# Patient Record
Sex: MALE | Race: WHITE | URBAN - METROPOLITAN AREA
[De-identification: names, ages, dates, MRNs, and addresses within clinical notes are randomized per-mention and may not be internally consistent; named-entity substitution may affect disease eponyms.]

---

## 2020-03-18 ENCOUNTER — HISTORICAL (OUTPATIENT)
Dept: ADMINISTRATIVE | Facility: HOSPITAL | Age: 25
End: 2020-03-18

## 2020-03-19 ENCOUNTER — HISTORICAL (OUTPATIENT)
Dept: SURGERY | Facility: HOSPITAL | Age: 25
End: 2020-03-19

## 2020-04-01 ENCOUNTER — HISTORICAL (OUTPATIENT)
Dept: ADMINISTRATIVE | Facility: HOSPITAL | Age: 25
End: 2020-04-01

## 2020-04-29 ENCOUNTER — HISTORICAL (OUTPATIENT)
Dept: ADMINISTRATIVE | Facility: HOSPITAL | Age: 25
End: 2020-04-29

## 2020-06-10 ENCOUNTER — HISTORICAL (OUTPATIENT)
Dept: ADMINISTRATIVE | Facility: HOSPITAL | Age: 25
End: 2020-06-10

## 2020-09-23 ENCOUNTER — HISTORICAL (OUTPATIENT)
Dept: ADMINISTRATIVE | Facility: HOSPITAL | Age: 25
End: 2020-09-23

## 2021-11-22 ENCOUNTER — HISTORICAL (OUTPATIENT)
Dept: ADMINISTRATIVE | Facility: HOSPITAL | Age: 26
End: 2021-11-22

## 2022-04-07 ENCOUNTER — HISTORICAL (OUTPATIENT)
Dept: ADMINISTRATIVE | Facility: HOSPITAL | Age: 27
End: 2022-04-07

## 2022-04-23 VITALS
SYSTOLIC BLOOD PRESSURE: 124 MMHG | WEIGHT: 153.44 LBS | BODY MASS INDEX: 25.56 KG/M2 | DIASTOLIC BLOOD PRESSURE: 70 MMHG | HEIGHT: 65 IN

## 2022-04-28 NOTE — H&P
Patient:   Nabil Lucas             MRN: [id number]            FIN: [id number]               Age:   24 years     Sex:  Male     :  1995   Associated Diagnoses:   None   Author:   All Fernandes MD      24 Years old RHD Male non-smoker presents to ortho clinic for initial evaluation for left knee.  Patient has had pain and swelling. He has been non weight bearing and only has pain in the early morning. He has some pain at 3 am every morning.  Patient is 1.5 weeks post injury ( DOI3/2020).  HAIM: fall while skateboarding  Previously seen ED, also seen at [facility] and diagnosed with patellar fracture .  Previous treatment: none  previous injuries: denies  Current pain level: 6/10 ( rated as none) medication helping  associated symptoms: no numbness and tingling; no swelling; no skin changes; no weakness; no decrease in ROM  Current activity level: non weight bearing on that leg  Family history: non contributory  Review of Systems Constitutional: no fever, no chills, no weight loss  CV: no swelling, no edema   Resp: no shortness of breath, no cough  GI: no fecal incontinence  : no urinary retention, no urinary incontinence  Skin: no rash, no wound  Neuro: no numbness/tingling, no weakness, no saddle anesthesia  MSK: as above  Psych: no depression, no anxiety  Heme/Lymph: no easy bruising, no easy bleeding, no lymphadenopathy  Physical Exam   Vitals & Measurements HT: 166 cm WT: 66.5 kg BMI: 24.13 General: well developed; well nourished; cooperative  Neck: no abnormalities noted  Eyes: no injection in the conjunctiva, no lid lag noted  PSYCH: alert and oriented with appropriate mood and affect  SKIN: inspection and palpation of skin and soft tissue normal; no scars noted on upper/lower extremities  CV: vascular integrity noted; +2 symmetrical pulses, no edema  Respiratory: no increased respiratory effort noted  NEURO: sensation intact by light touch; DTRs +2 bilateral and symmetrical  LYMPH: no LAD  noted    MSK Exam:   left LE  Inspection: swelling noted diffusely in the knee joint ; Erythema: bruising to the lower leg   Palpation: TTP over the knee ,   ROM: severely decreased Strength: 0/5 with extension, 2/5 with flexion  Neurovascular: intact sensation to light touch, pulses 2+, capillary refill less than 2 seconds   Left leg extensor mechanism not intact on exam Assessment/Plan 1. Left patella fracture S82.002A Plan: Patient with a transverse patella fracture of the left knee, without an intact extensor mechanism. This will require a surgical repair , discussed treatment options with patient.  Rads: imaging ordered and independently reviewed by me, discussed with patient, radiologist read pending  Immobilization: non weight bearing, crutches, continue bracing  Therapy: none  Rx: OTC medication PRN  Work-up: no additional work up  Activity: NWB for until surgery  RTC: after procedure  Orders:   XR Knee Left 1 or 2 Views, Routine, 03/18/20 8:42:00 CDT, Fracture, None, Ambulatory, Rad Type, Patellar fracture, Not Scheduled, 03/18/20 8:42:00 CDT  Medications Aleve 220 mg oral tablet, 1 cap(s), Oral, q8hr, PRN Norco 5 mg-325 mg oral tablet, 1 tab(s), Oral, q4hr, PRN, Not Taking, Completed Rx   Diagnostic Results XR left knee my read  Transverse patella fracture noted   Addendum by All Fernandes MD on March 18, 2020 09:41:29 CDT (Verified)  The risks, benefits, outcomes, and alternatives of conservative vs operative management were discussed with the patient in clinic. Informed consent was obtained for ORIF left patella fracture vs partial patellectomy with patella tendon advancement.    Nabil Lucas was evaluated at the time of the encounter with Dr Mix. HPI, PE and treatment plan was reviewed. Treatment plan was reasonable and necessary. Imaging was reviewed at the time of visit.